# Patient Record
Sex: FEMALE | Race: BLACK OR AFRICAN AMERICAN | Employment: FULL TIME | URBAN - METROPOLITAN AREA
[De-identification: names, ages, dates, MRNs, and addresses within clinical notes are randomized per-mention and may not be internally consistent; named-entity substitution may affect disease eponyms.]

---

## 2018-03-02 ENCOUNTER — HOSPITAL ENCOUNTER (OUTPATIENT)
Dept: MAMMOGRAPHY | Age: 48
Discharge: HOME | End: 2018-03-02
Attending: INTERNAL MEDICINE
Payer: COMMERCIAL

## 2018-03-02 ENCOUNTER — HOSPITAL ENCOUNTER (OUTPATIENT)
Dept: ULTRASOUND IMAGING | Age: 48
Discharge: HOME | End: 2018-03-02
Attending: INTERNAL MEDICINE
Payer: COMMERCIAL

## 2018-03-02 DIAGNOSIS — N64.4 BREAST PAIN: ICD-10-CM

## 2018-03-02 PROCEDURE — 77066 DX MAMMO INCL CAD BI: CPT

## 2018-03-02 PROCEDURE — 76642 ULTRASOUND BREAST LIMITED: CPT

## 2020-10-26 ENCOUNTER — OFFICE VISIT (OUTPATIENT)
Dept: SURGERY | Age: 50
End: 2020-10-26
Payer: COMMERCIAL

## 2020-10-26 VITALS
SYSTOLIC BLOOD PRESSURE: 116 MMHG | OXYGEN SATURATION: 99 % | RESPIRATION RATE: 18 BRPM | BODY MASS INDEX: 29.82 KG/M2 | WEIGHT: 190 LBS | TEMPERATURE: 97.4 F | DIASTOLIC BLOOD PRESSURE: 81 MMHG | HEART RATE: 79 BPM | HEIGHT: 67 IN

## 2020-10-26 DIAGNOSIS — K57.32 DIVERTICULITIS OF COLON: Primary | ICD-10-CM

## 2020-10-26 PROCEDURE — 99204 OFFICE O/P NEW MOD 45 MIN: CPT | Performed by: COLON & RECTAL SURGERY

## 2020-10-26 RX ORDER — METRONIDAZOLE 500 MG/1
500 TABLET ORAL 3 TIMES DAILY
Qty: 3 TAB | Refills: 0 | Status: SHIPPED | OUTPATIENT
Start: 2020-10-26 | End: 2020-10-26 | Stop reason: CLARIF

## 2020-10-26 RX ORDER — LEVOFLOXACIN 500 MG/1
500 TABLET, FILM COATED ORAL DAILY
Qty: 7 TAB | Refills: 0 | Status: SHIPPED | OUTPATIENT
Start: 2020-10-26 | End: 2020-11-02

## 2020-10-26 RX ORDER — FLUCONAZOLE 150 MG/1
150 TABLET ORAL DAILY
Qty: 1 TAB | Refills: 0 | Status: SHIPPED | OUTPATIENT
Start: 2020-10-26 | End: 2020-10-27

## 2020-10-26 RX ORDER — METRONIDAZOLE 500 MG/1
500 TABLET ORAL 3 TIMES DAILY
Qty: 24 TAB | Refills: 0 | Status: SHIPPED | OUTPATIENT
Start: 2020-10-26 | End: 2020-11-03

## 2020-10-26 RX ORDER — NEOMYCIN SULFATE 500 MG/1
1000 TABLET ORAL 3 TIMES DAILY
Qty: 6 TAB | Refills: 0 | Status: SHIPPED | OUTPATIENT
Start: 2020-10-26 | End: 2020-10-27

## 2020-10-26 NOTE — LETTER
10/26/2020 2:59 PM 
 
Patient:  Shawna Anderson YOB: 1970 Date of Visit: 10/26/2020 Maia Barlow MD 
[address] [place] [address] [address] [id number] [place] [phone] VIA In Basket Dear Federico Walsh, 
 
I saw Osborne Castleman in the office today for recurrent diverticulitis. She has been suffering attacks since 2013. She has multiple CT scans showing sigmoid diverticulitis, though 1 or 2 show inflammation at the splenic flexure. On exam today she has mild left lower quadrant pain. Her last colonoscopy was in 2017 and was normal with a 10-year recall. I have recommended robotic sigmoid colectomy and she is willing to proceed. I will have her bring the CDs of her CAT scans from [facility]. There is some inflammation at the splenic flexure on one of the reports. If I review the CDs and this is substantial we may consider a more extended resection. Thank you very much for your referral of Ms. Nicole Cadena. If you have questions, please do not hesitate to call me. I look forward to following Ms. Cadena along with you and will keep you updated as to her progress. Sincerely, Loco Rondon MD

## 2020-10-26 NOTE — PROGRESS NOTES
HPI: Eloise Devine is a 48 y.o. female presenting with chief complain of diverticulitis. She has had multiple episodes since 2013. They total approximately 6. She has had multiple CTs, all of which show sigmoid diverticulitis. There was one imaging study which showed splenic flexure disease as well. Pain is usually in the left lower quadrant. It is constant and moderate. It is sharp. She moves her bowels on a daily basis. She denies fecal incontinence. She occasionally sees blood on the toilet paper and in the toilet bowl. She has no family history of colon cancer her last colonoscopy was 2017 by Dr. La Smith this was normal with a 10-year recall. Past Medical History:   Diagnosis Date    Depression     Fibroids     Herpes        Past Surgical History:   Procedure Laterality Date    HX  SECTION      HX  SECTION      HX HYSTERECTOMY      HX OTHER SURGICAL      mass on right forearm, benign calcium buildup    HX TUBAL LIGATION         Family History   Problem Relation Age of Onset    Cancer Mother     Hypertension Father     Hypertension Sister     No Known Problems Sister        Social History     Socioeconomic History    Marital status:      Spouse name: Not on file    Number of children: Not on file    Years of education: Not on file    Highest education level: Not on file   Tobacco Use    Smoking status: Never Smoker    Smokeless tobacco: Never Used   Substance and Sexual Activity    Alcohol use: No    Drug use: No    Sexual activity: Not Currently       Review of Systems - Review of Systems   Constitutional: Positive for diaphoresis. Negative for chills, fever, malaise/fatigue and weight loss. Hot flashes occas. HENT: Negative. Eyes: Positive for blurred vision. Negative for double vision, photophobia, pain, discharge and redness. Respiratory: Negative. Cardiovascular: Positive for leg swelling.  Negative for chest pain, palpitations, orthopnea, claudication and PND. Gastrointestinal: Positive for blood in stool and constipation. Negative for abdominal pain, diarrhea, heartburn, melena, nausea and vomiting. Genitourinary: Positive for hematuria. Negative for dysuria, flank pain, frequency and urgency. Musculoskeletal: Positive for joint pain. Negative for back pain, falls, myalgias and neck pain. Skin: Negative. excema   Neurological: Positive for tingling. Negative for dizziness, tremors, sensory change, speech change, focal weakness, seizures, loss of consciousness, weakness and headaches. Fingertips in tingling   Endo/Heme/Allergies: Negative for environmental allergies and polydipsia. Bruises/bleeds easily. Psychiatric/Behavioral: Negative for depression, hallucinations, memory loss, substance abuse and suicidal ideas. The patient is nervous/anxious. The patient does not have insomnia. Outpatient Medications Marked as Taking for the 10/26/20 encounter (Office Visit) with Melanie Michel MD   Medication Sig Dispense Refill    mv,Ca,min/iron/FA/guarana/caff (ONE-A-DAY WOMEN'S ACTIVE PO) Take  by mouth.  citalopram (CELEXA) 20 mg tablet Take 1 Tab by mouth daily. 30 Tab 1    L. ACIDOPHILUS/BIFID. ANIMALIS (ONE-A-DAY TRUBIOTICS PO) Take  by mouth. No Known Allergies    Vitals:    10/26/20 1411   Resp: 18   Temp: 97.4 °F (36.3 °C)   TempSrc: Temporal   Weight: 86.2 kg (190 lb)   Height: 5' 6.6\" (1.692 m)   PainSc:   0 - No pain   LMP: 06/10/2012       Physical Exam  Constitutional:       Appearance: She is well-developed. HENT:      Head: Normocephalic and atraumatic. Eyes:      Conjunctiva/sclera: Conjunctivae normal.   Abdominal:      General: There is no distension. Palpations: Abdomen is soft. Tenderness: There is abdominal tenderness (Mild, left lower quadrant). Musculoskeletal: Normal range of motion.    Lymphadenopathy:      Cervical: No cervical adenopathy. Skin:     General: Skin is warm and dry. Findings: No rash. Neurological:      Sensory: No sensory deficit. Psychiatric:         Speech: Speech normal.     Digital rectal exam: Moderate tone, no mass    Colonoscopy report reviewed from June 2017, normal exam with 10-year recall  CT imaging reports from care everywhere reviewed; as above, with largely sigmoid diverticulitis; there is at least one scan with splenic flexure inflammation as well    Assessment / Plan    Multiply recurrent diverticulitis  Recommend robotic sigmoid colectomy  Patient should bring in CDs of all of her CTs from [facility]  If there is substantial inflammation at the splenic flexure we may consider a more extended resection  At this point I tell the patient that given that the inflammation has almost always been in the sigmoid colon that resecting just a segment is reasonable, but she should understand she may develop recurrent disease proximal  She tells me she understands and wishes to proceed    The diagnoses and plan were discussed with the patient. All questions answered. Plan of care agreed to by all concerned.

## 2020-12-07 ENCOUNTER — DOCUMENTATION ONLY (OUTPATIENT)
Dept: SURGERY | Age: 50
End: 2020-12-07

## 2020-12-07 NOTE — PROGRESS NOTES
CT imaging from [id number] all reviewed. Diverticulitis is all sigmoid colon. I do not see any evidence of diverticular disease in the splenic flexure. The majority of the disease appears to be in the mid sigmoid colon at midline, proximal to the rectum. In 2015 it was slightly more proximal, however it was never anywhere near the descending colon. This was all simple diverticulitis, there is no abscess.

## 2020-12-14 ENCOUNTER — HOSPITAL ENCOUNTER (OUTPATIENT)
Dept: LAB | Age: 50
Discharge: HOME | End: 2020-12-14
Payer: COMMERCIAL

## 2020-12-14 DIAGNOSIS — K57.32 DIVERTICULITIS OF COLON: ICD-10-CM

## 2020-12-14 LAB
ABO + RH BLD: NORMAL
ALBUMIN SERPL-MCNC: 3.8 G/DL (ref 3.4–5)
ANION GAP SERPL CALC-SCNC: 6 MMOL/L (ref 3–18)
APTT PPP: 31 SEC (ref 23–36.4)
BASOPHILS # BLD: 0 K/UL (ref 0–0.1)
BASOPHILS NFR BLD: 0 % (ref 0–2)
BLOOD GROUP ANTIBODIES SERPL: NORMAL
BUN SERPL-MCNC: 8 MG/DL (ref 7–18)
BUN/CREAT SERPL: 14 (ref 12–20)
CALCIUM SERPL-MCNC: 9.1 MG/DL (ref 8.5–10.1)
CHLORIDE SERPL-SCNC: 107 MMOL/L (ref 100–111)
CO2 SERPL-SCNC: 28 MMOL/L (ref 21–32)
CREAT SERPL-MCNC: 0.58 MG/DL (ref 0.6–1.3)
DIFFERENTIAL METHOD BLD: NORMAL
EOSINOPHIL # BLD: 0.3 K/UL (ref 0–0.4)
EOSINOPHIL NFR BLD: 3 % (ref 0–5)
ERYTHROCYTE [DISTWIDTH] IN BLOOD BY AUTOMATED COUNT: 13.7 % (ref 11.6–14.5)
GLUCOSE SERPL-MCNC: 89 MG/DL (ref 74–99)
HCT VFR BLD AUTO: 40.1 % (ref 35–45)
HGB BLD-MCNC: 13.1 G/DL (ref 12–16)
INR PPP: 1 (ref 0.8–1.2)
LYMPHOCYTES # BLD: 2.7 K/UL (ref 0.9–3.6)
LYMPHOCYTES NFR BLD: 31 % (ref 21–52)
MCH RBC QN AUTO: 30.3 PG (ref 24–34)
MCHC RBC AUTO-ENTMCNC: 32.7 G/DL (ref 31–37)
MCV RBC AUTO: 92.8 FL (ref 74–97)
MONOCYTES # BLD: 0.7 K/UL (ref 0.05–1.2)
MONOCYTES NFR BLD: 8 % (ref 3–10)
NEUTS SEG # BLD: 5 K/UL (ref 1.8–8)
NEUTS SEG NFR BLD: 58 % (ref 40–73)
PLATELET # BLD AUTO: 256 K/UL (ref 135–420)
PMV BLD AUTO: 10.2 FL (ref 9.2–11.8)
POTASSIUM SERPL-SCNC: 4 MMOL/L (ref 3.5–5.5)
PROTHROMBIN TIME: 12.9 SEC (ref 11.5–15.2)
RBC # BLD AUTO: 4.32 M/UL (ref 4.2–5.3)
SODIUM SERPL-SCNC: 141 MMOL/L (ref 136–145)
SPECIMEN EXP DATE BLD: NORMAL
WBC # BLD AUTO: 8.6 K/UL (ref 4.6–13.2)

## 2020-12-14 PROCEDURE — 80048 BASIC METABOLIC PNL TOTAL CA: CPT

## 2020-12-14 PROCEDURE — 36415 COLL VENOUS BLD VENIPUNCTURE: CPT

## 2020-12-14 PROCEDURE — 85610 PROTHROMBIN TIME: CPT

## 2020-12-14 PROCEDURE — 85730 THROMBOPLASTIN TIME PARTIAL: CPT

## 2020-12-14 PROCEDURE — 82040 ASSAY OF SERUM ALBUMIN: CPT

## 2020-12-14 PROCEDURE — 85025 COMPLETE CBC W/AUTO DIFF WBC: CPT

## 2020-12-14 PROCEDURE — 86900 BLOOD TYPING SEROLOGIC ABO: CPT

## 2020-12-17 ENCOUNTER — HOSPITAL ENCOUNTER (OUTPATIENT)
Dept: PREADMISSION TESTING | Age: 50
Discharge: HOME | End: 2020-12-17
Payer: COMMERCIAL

## 2020-12-17 DIAGNOSIS — K57.32 DIVERTICULITIS OF COLON: ICD-10-CM

## 2020-12-17 PROCEDURE — 87635 SARS-COV-2 COVID-19 AMP PRB: CPT

## 2020-12-18 LAB — SARS-COV-2, COV2NT: NOT DETECTED

## 2021-01-12 ENCOUNTER — TELEPHONE (OUTPATIENT)
Dept: SURGERY | Age: 51
End: 2021-01-12

## 2021-01-12 NOTE — TELEPHONE ENCOUNTER
On 1/12/2021 at 11:41am this patient left me a voice message to call her back - that she wanted me to have her CT disks for her at the . I called he back at 11:49am- she stated that she was very upset with me because per our last converse before during the [address] holiday I told her I would call her back on 1/4/2021 to let her know about rescheduling her surgery. I did call her on 1/6/2021 at 9:30am but was unable to leave a voice message on her numbers due to voice message not set up. She stated that she did see that I called on that date but by then it was too late- I failed to keep my promise of calling her on 1/4/2021. I did acknowledge that and apologized. She also stated that she was upset because her COVID pre-op test results never showed up in her [facility].  I did give her the negative results the week after her test.

## 2021-04-20 PROBLEM — K57.32 DIVERTICULITIS LARGE INTESTINE W/O PERFORATION OR ABSCESS W/O BLEEDING: Status: ACTIVE | Noted: 2021-04-20
